# Patient Record
(demographics unavailable — no encounter records)

---

## 2025-07-17 NOTE — PHYSICAL EXAM
[de-identified] : General: Well-nourished, well-developed, alert, and in no acute distress. Head: Normocephalic. Eyes: Pupils equal, extraocular muscles intact, normal sclera. Nose: No nasal discharge. Cardiovascular: Extremities are warm and well perfused. Distal pulses are symmetric bilaterally. Respiratory: No labored breathing. Extremities: Sensation is intact distally bilaterally. Distal pulses are symmetric bilaterally Lymphatic: No regional lymphadenopathy, no lymphedema Neurologic: No focal deficits Skin: Normal skin color, texture, and turgor Psychiatric: Normal affect 						 MSK: Examination of right knee:   Gait non-antalgic Alignment: slight genu valgum with femoral anteversion b/l No pain with double leg squat Valgus moment with single leg squat No effusion No erythema or skin lesion. Small hematoma at medial joint line.  Tender to palpation: medial joint line, Nontender to palpation:  lateral joint line, medial patellar facet, lateral patellar facet, quad tendon, patellar tendon, pes, Gerdy's tubercle, tibial tuberosity, popliteal fossa, hamstrings, ITB No warmth No Baker's cyst palpable ROM: 0-130 No patellar crepitus   Log roll negative Lachman negative Anterior drawer negative Posterior drawer negative Varus/valgus stress negative at 0 and 30 deg Joshua negative Patellar grind negative Thessaly negative    Sensation is intact to light touch over the superficial and deep peroneal nerve distributions and the posterior tibial nerve distribution. Capillary refill is less than two seconds. Posterior tibial and dorsalis pedis pulses 2+ equal bilaterally. No calf swelling or tenderness bilaterally. Strength testing shows hip flexion 5/5, hip adduction 5/5, hip abduction 5/5, knee extension 5/5, knee flexion 5/5, dorsiflexion 5/5, plantar flexion 5/5, EHL 5/5 Reflexes: Patellar 2+, Achilles 2+ [de-identified] :  Date: 07/14/2025 Location: [facility] Body part: XRAY R Knee independently reviewed and interpreted by me. Impression: There is no evidence of fracture or dislocation. The joint spaces are preserved.

## 2025-07-17 NOTE — END OF VISIT
[FreeTextEntry3] : Documented by Bernie Rodarte acting as a scribe for Dr. Isabella Jacobs. 07/14/2025  All medical record entries made by the Scribe were at my, Dr. Isabella Jacobs, direction and personally dictated by me on 07/14/2025. I have reviewed the chart and agree that the record accurately reflects my personal performance of the history, physical exam, assessment and plan. I have also personally directed, reviewed, and agreed with the chart.  [Time Spent: ___ minutes] : I have spent [unfilled] minutes of time on the encounter which excludes teaching and separately reported services.

## 2025-07-17 NOTE — DISCUSSION/SUMMARY

## 2025-07-17 NOTE — HISTORY OF PRESENT ILLNESS
[de-identified] :  ARGENIS SIMMONS is a 28-year-old female presents with right knee pain for the past 4 months. The pain is described as sharp, tender, aching, and is constant. Patient reports the pain began when she started bar classes. Symptoms are exacerbated by walking, standing, bending, lifting, etc. and alleviated by knee band, Tylenol, ice. Associated symptoms include swelling, stiffness, weakness, numbness, instability, and has a bruise (healing).   27 y/o female presents with R knee pain. She states about four months ago, she had a stress fracture of the L shin. She states she stopped running to let it heal, up until three weeks ago when she started bar classes. Pt states during a bar class, she injured her R knee, so she again took a break for about two weeks. Recently, she was on the stair machine with a weighted 10lb backpack, as she is training for a hike, where she felt stiffness, tenderness, and bruising in the interior medial aspect of the knee. Pt is using Ibuprofen, Tylenol, and compression sleeve for pain. Pt states three weeks ago, she felt unsteadiness in her knee when ambulating up and down stairs. Pt has a history of retroversion of the femur, and she questions if she should get inserts in her shoes for steadiness.

## 2025-07-17 NOTE — ADDENDUM
[FreeTextEntry1] : I, Bernie Rodarte, acted as a scribe on behalf of Dr. Isabella Jacobs on 07/14/2025.

## 2025-07-17 NOTE — PHYSICAL EXAM
[de-identified] : General: Well-nourished, well-developed, alert, and in no acute distress. Head: Normocephalic. Eyes: Pupils equal, extraocular muscles intact, normal sclera. Nose: No nasal discharge. Cardiovascular: Extremities are warm and well perfused. Distal pulses are symmetric bilaterally. Respiratory: No labored breathing. Extremities: Sensation is intact distally bilaterally. Distal pulses are symmetric bilaterally Lymphatic: No regional lymphadenopathy, no lymphedema Neurologic: No focal deficits Skin: Normal skin color, texture, and turgor Psychiatric: Normal affect 						 MSK: Examination of right knee:   Gait non-antalgic Alignment: slight genu valgum with femoral anteversion b/l No pain with double leg squat Valgus moment with single leg squat No effusion No erythema or skin lesion. Small hematoma at medial joint line.  Tender to palpation: medial joint line, Nontender to palpation:  lateral joint line, medial patellar facet, lateral patellar facet, quad tendon, patellar tendon, pes, Gerdy's tubercle, tibial tuberosity, popliteal fossa, hamstrings, ITB No warmth No Baker's cyst palpable ROM: 0-130 No patellar crepitus   Log roll negative Lachman negative Anterior drawer negative Posterior drawer negative Varus/valgus stress negative at 0 and 30 deg Joshua negative Patellar grind negative Thessaly negative    Sensation is intact to light touch over the superficial and deep peroneal nerve distributions and the posterior tibial nerve distribution. Capillary refill is less than two seconds. Posterior tibial and dorsalis pedis pulses 2+ equal bilaterally. No calf swelling or tenderness bilaterally. Strength testing shows hip flexion 5/5, hip adduction 5/5, hip abduction 5/5, knee extension 5/5, knee flexion 5/5, dorsiflexion 5/5, plantar flexion 5/5, EHL 5/5 Reflexes: Patellar 2+, Achilles 2+ [de-identified] :  Date: 07/14/2025 Location: [facility] Body part: XRAY R Knee independently reviewed and interpreted by me. Impression: There is no evidence of fracture or dislocation. The joint spaces are preserved.

## 2025-07-17 NOTE — DISCUSSION/SUMMARY

## 2025-07-17 NOTE — HISTORY OF PRESENT ILLNESS
[de-identified] :  ARGENIS SIMMONS is a 28-year-old female presents with right knee pain for the past 4 months. The pain is described as sharp, tender, aching, and is constant. Patient reports the pain began when she started bar classes. Symptoms are exacerbated by walking, standing, bending, lifting, etc. and alleviated by knee band, Tylenol, ice. Associated symptoms include swelling, stiffness, weakness, numbness, instability, and has a bruise (healing).   27 y/o female presents with R knee pain. She states about four months ago, she had a stress fracture of the L shin. She states she stopped running to let it heal, up until three weeks ago when she started bar classes. Pt states during a bar class, she injured her R knee, so she again took a break for about two weeks. Recently, she was on the stair machine with a weighted 10lb backpack, as she is training for a hike, where she felt stiffness, tenderness, and bruising in the interior medial aspect of the knee. Pt is using Ibuprofen, Tylenol, and compression sleeve for pain. Pt states three weeks ago, she felt unsteadiness in her knee when ambulating up and down stairs. Pt has a history of retroversion of the femur, and she questions if she should get inserts in her shoes for steadiness.